# Patient Record
Sex: MALE | Race: WHITE | Employment: FULL TIME | URBAN - METROPOLITAN AREA
[De-identification: names, ages, dates, MRNs, and addresses within clinical notes are randomized per-mention and may not be internally consistent; named-entity substitution may affect disease eponyms.]

---

## 2019-02-16 ENCOUNTER — HOSPITAL ENCOUNTER (EMERGENCY)
Age: 34
Discharge: PSYCHIATRIC OR SUBSTANCE USE FACILITY | End: 2019-02-17
Attending: EMERGENCY MEDICINE | Admitting: EMERGENCY MEDICINE
Payer: MEDICAID

## 2019-02-16 DIAGNOSIS — F41.1 ANXIETY STATE: ICD-10-CM

## 2019-02-16 DIAGNOSIS — R45.851 SUICIDAL IDEATION: ICD-10-CM

## 2019-02-16 DIAGNOSIS — F31.60 BIPOLAR AFFECTIVE DISORDER, CURRENT EPISODE MIXED, CURRENT EPISODE SEVERITY UNSPECIFIED (HCC): Primary | ICD-10-CM

## 2019-02-16 LAB
ALBUMIN SERPL-MCNC: 4.2 G/DL (ref 3.5–5)
ALBUMIN/GLOB SERPL: 1.2 {RATIO} (ref 1.1–2.2)
ALP SERPL-CCNC: 58 U/L (ref 45–117)
ALT SERPL-CCNC: 32 U/L (ref 12–78)
AMPHET UR QL SCN: NEGATIVE
ANION GAP SERPL CALC-SCNC: 14 MMOL/L (ref 5–15)
APPEARANCE UR: CLEAR
AST SERPL-CCNC: 16 U/L (ref 15–37)
BACTERIA URNS QL MICRO: NEGATIVE /HPF
BARBITURATES UR QL SCN: NEGATIVE
BASOPHILS # BLD: 0 K/UL (ref 0–0.1)
BASOPHILS NFR BLD: 0 % (ref 0–1)
BENZODIAZ UR QL: NEGATIVE
BILIRUB SERPL-MCNC: 0.9 MG/DL (ref 0.2–1)
BILIRUB UR QL: NEGATIVE
BUN SERPL-MCNC: 13 MG/DL (ref 6–20)
BUN/CREAT SERPL: 14 (ref 12–20)
CALCIUM SERPL-MCNC: 9.2 MG/DL (ref 8.5–10.1)
CANNABINOIDS UR QL SCN: NEGATIVE
CHLORIDE SERPL-SCNC: 101 MMOL/L (ref 97–108)
CO2 SERPL-SCNC: 23 MMOL/L (ref 21–32)
COCAINE UR QL SCN: NEGATIVE
COLOR UR: ABNORMAL
COMMENT, HOLDF: NORMAL
CREAT SERPL-MCNC: 0.91 MG/DL (ref 0.7–1.3)
DIFFERENTIAL METHOD BLD: ABNORMAL
DRUG SCRN COMMENT,DRGCM: NORMAL
EOSINOPHIL # BLD: 0 K/UL (ref 0–0.4)
EOSINOPHIL NFR BLD: 0 % (ref 0–7)
EPITH CASTS URNS QL MICRO: ABNORMAL /LPF
ERYTHROCYTE [DISTWIDTH] IN BLOOD BY AUTOMATED COUNT: 12.3 % (ref 11.5–14.5)
ETHANOL SERPL-MCNC: <10 MG/DL
GLOBULIN SER CALC-MCNC: 3.5 G/DL (ref 2–4)
GLUCOSE SERPL-MCNC: 120 MG/DL (ref 65–100)
GLUCOSE UR STRIP.AUTO-MCNC: NEGATIVE MG/DL
HCT VFR BLD AUTO: 45.5 % (ref 36.6–50.3)
HGB BLD-MCNC: 15.6 G/DL (ref 12.1–17)
HGB UR QL STRIP: ABNORMAL
HYALINE CASTS URNS QL MICRO: ABNORMAL /LPF (ref 0–5)
IMM GRANULOCYTES # BLD AUTO: 0 K/UL (ref 0–0.04)
IMM GRANULOCYTES NFR BLD AUTO: 0 % (ref 0–0.5)
KETONES UR QL STRIP.AUTO: NEGATIVE MG/DL
LEUKOCYTE ESTERASE UR QL STRIP.AUTO: NEGATIVE
LYMPHOCYTES # BLD: 1.7 K/UL (ref 0.8–3.5)
LYMPHOCYTES NFR BLD: 17 % (ref 12–49)
MCH RBC QN AUTO: 28.1 PG (ref 26–34)
MCHC RBC AUTO-ENTMCNC: 34.3 G/DL (ref 30–36.5)
MCV RBC AUTO: 81.8 FL (ref 80–99)
METHADONE UR QL: NEGATIVE
MONOCYTES # BLD: 0.4 K/UL (ref 0–1)
MONOCYTES NFR BLD: 4 % (ref 5–13)
NEUTS SEG # BLD: 7.7 K/UL (ref 1.8–8)
NEUTS SEG NFR BLD: 78 % (ref 32–75)
NITRITE UR QL STRIP.AUTO: NEGATIVE
NRBC # BLD: 0 K/UL (ref 0–0.01)
NRBC BLD-RTO: 0 PER 100 WBC
OPIATES UR QL: NEGATIVE
PCP UR QL: NEGATIVE
PH UR STRIP: 6.5 [PH] (ref 5–8)
PLATELET # BLD AUTO: 256 K/UL (ref 150–400)
PMV BLD AUTO: 9.4 FL (ref 8.9–12.9)
POTASSIUM SERPL-SCNC: 3.2 MMOL/L (ref 3.5–5.1)
PROT SERPL-MCNC: 7.7 G/DL (ref 6.4–8.2)
PROT UR STRIP-MCNC: NEGATIVE MG/DL
RBC # BLD AUTO: 5.56 M/UL (ref 4.1–5.7)
RBC #/AREA URNS HPF: ABNORMAL /HPF (ref 0–5)
SAMPLES BEING HELD,HOLD: NORMAL
SODIUM SERPL-SCNC: 138 MMOL/L (ref 136–145)
SP GR UR REFRACTOMETRY: 1.01 (ref 1–1.03)
TSH SERPL DL<=0.05 MIU/L-ACNC: 1.35 UIU/ML (ref 0.36–3.74)
UR CULT HOLD, URHOLD: NORMAL
UROBILINOGEN UR QL STRIP.AUTO: 0.2 EU/DL (ref 0.2–1)
WBC # BLD AUTO: 9.9 K/UL (ref 4.1–11.1)
WBC URNS QL MICRO: ABNORMAL /HPF (ref 0–4)

## 2019-02-16 PROCEDURE — 85025 COMPLETE CBC W/AUTO DIFF WBC: CPT

## 2019-02-16 PROCEDURE — 36415 COLL VENOUS BLD VENIPUNCTURE: CPT

## 2019-02-16 PROCEDURE — 80307 DRUG TEST PRSMV CHEM ANLYZR: CPT

## 2019-02-16 PROCEDURE — 84443 ASSAY THYROID STIM HORMONE: CPT

## 2019-02-16 PROCEDURE — 80053 COMPREHEN METABOLIC PANEL: CPT

## 2019-02-16 PROCEDURE — 99285 EMERGENCY DEPT VISIT HI MDM: CPT

## 2019-02-16 PROCEDURE — 81001 URINALYSIS AUTO W/SCOPE: CPT

## 2019-02-16 PROCEDURE — 90791 PSYCH DIAGNOSTIC EVALUATION: CPT

## 2019-02-16 RX ORDER — POTASSIUM CHLORIDE 750 MG/1
40 TABLET, FILM COATED, EXTENDED RELEASE ORAL
Status: DISCONTINUED | OUTPATIENT
Start: 2019-02-16 | End: 2019-02-17 | Stop reason: HOSPADM
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
## 2019-02-16 NOTE — ED TRIAGE NOTES
Pt code [place] at [facility]. Per family, pt has not taken medications for approx 3 months for BiPolar disorder as \"it makes him gain weight so he stops taking them\". Family unsure what medications are and reports pt has expressed feeling depressed for 2-3 weeks & is often suicidal when in this state. Pt placed in 4 pt restraints on stretcher at [address]. Speaks english but will not speak to staff or respond to request. Pt alert & oriented x 0 at this time.

## 2019-02-16 NOTE — BSMART NOTE
I spoke with Jolanta Thibodeaux from [facility] who states patient is under an ECO and he is on his way to see him in the ED.
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
## 2019-02-17 VITALS
DIASTOLIC BLOOD PRESSURE: 80 MMHG | HEIGHT: 69 IN | SYSTOLIC BLOOD PRESSURE: 123 MMHG | TEMPERATURE: 98.1 F | BODY MASS INDEX: 26.66 KG/M2 | WEIGHT: 180 LBS | OXYGEN SATURATION: 98 % | RESPIRATION RATE: 18 BRPM | HEART RATE: 76 BPM

## 2019-02-17 NOTE — ED NOTES
Spoke with Quiana Kohler again, states pt will be transferred to [facility]. No other information has been provided at this time. Faxed pt documentation to [facility] at this time.

## 2019-02-17 NOTE — ED NOTES
Due to high acuity, restraint order  at  and was not renewed at that time. After reassessments of pt's behavior during time of lapse, pt can be removed from restraints per MD. Restraints removed at this time. Pt has been calm and cooperative since resumed care. Sitter at beside and [place] Officer at bedside as well.

## 2019-02-17 NOTE — ED NOTES
TRANSFER - OUT REPORT: 
 
Verbal report given to Moustapha Hearing on [address]  being transferred to [facility] for routine progression of care Report consisted of patients Situation, Background, Assessment and  
Recommendations(SBAR). Information from the following report(s) SBAR, ED Summary and Recent Results was reviewed with the receiving nurse. Lines:  
Peripheral IV 02/16/19 Left Antecubital (Active) Site Assessment Clean, dry, & intact 2/16/2019  7:01 PM  
Phlebitis Assessment 0 2/16/2019  7:01 PM  
Infiltration Assessment 0 2/16/2019  7:01 PM  
Dressing Status Clean, dry, & intact 2/16/2019  7:01 PM  
Dressing Type Transparent 2/16/2019  7:01 PM  
Hub Color/Line Status Patent; Flushed 2/16/2019  7:01 PM  
  
 
Opportunity for questions and clarification was provided. Patient transported with: 
 [facility]

## 2019-02-17 NOTE — ED NOTES
Spoke with Aaron Reyes [phone]. Advised TDO has been issued and [facility] accepted, however they are on diversion and will be finding another location to accept patient. States once location has been located will contact again.

## 2019-02-17 NOTE — ED NOTES
T/c from Will at [facility]. Pt has been accepted to [facility]. Accepting Dr. Deandre Daugherty at [facility]. Awaiting TDO.

## 2019-02-17 NOTE — ED NOTES
Report given to Ronny Ramon at [facility] at this time. Paperwork completed for transfer.

## 2019-02-17 NOTE — ED NOTES
8:16 PM 
Change of shift. Care of patient taken over from Dr. Venkatesh Asher; H&P reviewed, bedside handoff complete. Awaiting ECO/TDO completion by [facility]. 8:24 PM 
Spoke with [facility]. Pt will be a TDO. Currently searching for a bed. 
 
1:02 AM 
Pt might be accepted to [facility] further info. Pt remains stable and cooperative in ED. 
 
1:52 AM 
Pt has been accepted to [facility]. Dr. Mayra Solano to accept pt. Awaiting TDO documentation and transportation. 2:31 AM 
Pt has now been turned down by [facility]. Awaiting another facility to accept pt. 
 
4:05 AM 
Pt accepted now at [facility]. Dr. Mickie Norwood to accept. TDO documentation present. Lola JIANG here for transportation. Pt has remained stable in NAD while in ED. 
 
4:23 AM 
Pt leaving via [place] [employer] to [facility] for further psychiatric care.

## 2019-02-17 NOTE — ED NOTES
Pt given healthy choice meal with a pepsi at this time. Pt is calm and cooperative, watching tv. Sitter and [place] Officer at bedside.

## 2019-02-17 NOTE — ED NOTES
Patient is in 4 point restraints Patient has sitter at bedside [facility] SHEA at bedside

## 2019-02-17 NOTE — ED NOTES
Dr. Brianna Cat from [facility] has accepted pt. ADDIS STEWART advised/ [facility] advised of location in [place], [place]. Calling now to give report to nurse.

## 2019-02-17 NOTE — ED NOTES
Accessed chart for restraint audit Patient does not have current order for restraints Spoke with primary RN, per Tiara's assessment relayed to Dr. Esperanza Levine, restraints ok to be removed Patient is TDO, restraints removed, family member, [facility] , and sitter at bedside

## 2019-02-17 NOTE — ED NOTES
T/C from Will at [facility]. MD at [facility] did not accept pt. Will need to contact [facility] [place] for acceptance. Refaxed documentation to [phone] at this time.

## 2019-02-17 NOTE — ED NOTES
T/C from [place]. [facility] may be location and asked documentation be faxed to that location. Faxing documentation at this time.